# Patient Record
Sex: FEMALE | Race: WHITE | Employment: UNEMPLOYED | URBAN - METROPOLITAN AREA
[De-identification: names, ages, dates, MRNs, and addresses within clinical notes are randomized per-mention and may not be internally consistent; named-entity substitution may affect disease eponyms.]

---

## 2021-10-25 ENCOUNTER — HOSPITAL ENCOUNTER (EMERGENCY)
Age: 36
Discharge: HOME | End: 2021-10-25
Attending: STUDENT IN AN ORGANIZED HEALTH CARE EDUCATION/TRAINING PROGRAM
Payer: MEDICAID

## 2021-10-25 ENCOUNTER — APPOINTMENT (OUTPATIENT)
Dept: ULTRASOUND IMAGING | Age: 36
End: 2021-10-25
Attending: STUDENT IN AN ORGANIZED HEALTH CARE EDUCATION/TRAINING PROGRAM
Payer: MEDICAID

## 2021-10-25 VITALS
RESPIRATION RATE: 16 BRPM | DIASTOLIC BLOOD PRESSURE: 86 MMHG | BODY MASS INDEX: 24.88 KG/M2 | SYSTOLIC BLOOD PRESSURE: 123 MMHG | WEIGHT: 168 LBS | HEIGHT: 69 IN | HEART RATE: 74 BPM | TEMPERATURE: 98.6 F | OXYGEN SATURATION: 100 %

## 2021-10-25 DIAGNOSIS — R10.2 PELVIC PAIN: Primary | ICD-10-CM

## 2021-10-25 LAB
APPEARANCE UR: CLEAR
BILIRUB UR QL: NEGATIVE
COLOR UR: YELLOW
GLUCOSE UR STRIP.AUTO-MCNC: NEGATIVE MG/DL
HCG UR QL: NEGATIVE
HGB UR QL STRIP: NEGATIVE
KETONES UR QL STRIP.AUTO: NEGATIVE MG/DL
LEUKOCYTE ESTERASE UR QL STRIP.AUTO: NEGATIVE
NITRITE UR QL STRIP.AUTO: NEGATIVE
PH UR STRIP: 6.5 [PH] (ref 5–8)
PROT UR STRIP-MCNC: NEGATIVE MG/DL
SERVICE CMNT-IMP: NORMAL
SP GR UR REFRACTOMETRY: 1.01 (ref 1–1.03)
UROBILINOGEN UR QL STRIP.AUTO: 0.2 EU/DL (ref 0.2–1)
WET PREP GENITAL: NORMAL

## 2021-10-25 PROCEDURE — 87210 SMEAR WET MOUNT SALINE/INK: CPT

## 2021-10-25 PROCEDURE — 81003 URINALYSIS AUTO W/O SCOPE: CPT

## 2021-10-25 PROCEDURE — 99283 EMERGENCY DEPT VISIT LOW MDM: CPT

## 2021-10-25 PROCEDURE — 81025 URINE PREGNANCY TEST: CPT

## 2021-10-25 PROCEDURE — 87491 CHLMYD TRACH DNA AMP PROBE: CPT

## 2021-10-25 PROCEDURE — 76830 TRANSVAGINAL US NON-OB: CPT

## 2021-10-25 RX ORDER — TERCONAZOLE 4 MG/G
1 CREAM VAGINAL
COMMUNITY
End: 2021-11-16

## 2021-10-25 NOTE — ED TRIAGE NOTES
Patient c/o pelvic pain, lower back pain, and urinary frequency x 2+ weeks. States intermittent dizziness and nausea since last night.

## 2021-10-25 NOTE — ED PROVIDER NOTES
Patient is a previously healthy 26-year-old female. Patient resents today with primary complaint of 4 to 5 days of worsening left lower quadrant pelvic pain that radiates to her back, patient recently underwent treatment for yeast infection at an outside facility and reports the itching and vaginal discharge have completely resolved no change in her abdominal/pelvic pain. Patient denies any associated fever/chills, current vaginal discharge, dysuria, other abdominal pain, diarrhea, or vaginal bleeding. Past Medical History:   Diagnosis Date    Yeast infection        History reviewed. No pertinent surgical history. History reviewed. No pertinent family history. Social History     Socioeconomic History    Marital status:      Spouse name: Not on file    Number of children: Not on file    Years of education: Not on file    Highest education level: Not on file   Occupational History    Not on file   Tobacco Use    Smoking status: Never Smoker    Smokeless tobacco: Never Used   Substance and Sexual Activity    Alcohol use: Yes    Drug use: Yes     Types: Marijuana    Sexual activity: Not on file   Other Topics Concern    Not on file   Social History Narrative    Not on file     Social Determinants of Health     Financial Resource Strain:     Difficulty of Paying Living Expenses:    Food Insecurity:     Worried About Running Out of Food in the Last Year:     [address] in the Last Year:    Transportation Needs:     Lack of Transportation (Medical):      Lack of Transportation (Non-Medical):    Physical Activity:     Days of Exercise per Week:     Minutes of Exercise per Session:    Stress:     Feeling of Stress :    Social Connections:     Frequency of Communication with Friends and Family:     Frequency of Social Gatherings with Friends and Family:     Attends Alevism Services:     Active Member of Clubs or Organizations:     Attends Club or Organization Meetings:     Marital Status:    Intimate Partner Violence:     Fear of Current or Ex-Partner:     Emotionally Abused:     Physically Abused:     Sexually Abused: ALLERGIES: Patient has no known allergies. Review of Systems   Constitutional: Negative for chills and fever. HENT: Negative for rhinorrhea and sore throat. Eyes: Negative for discharge and redness. Respiratory: Negative for cough and shortness of breath. Cardiovascular: Negative for chest pain and leg swelling. Gastrointestinal: Negative for abdominal pain, diarrhea, nausea and vomiting. Genitourinary: Positive for flank pain and pelvic pain. Negative for difficulty urinating, dysuria, urgency, vaginal bleeding, vaginal discharge and vaginal pain. Musculoskeletal: Negative for back pain and neck pain. Skin: Negative for rash and wound. Neurological: Negative for syncope, light-headedness and headaches. Vitals:    10/25/21 0946   BP: 123/86   Pulse: 74   Resp: 16   Temp: 98.6 °F (37 °C)   SpO2: 100%   Weight: 76.2 kg (168 lb)   Height: 5' 9\" (1.753 m)            Physical Exam  Constitutional:       General: She is not in acute distress. Appearance: She is not ill-appearing, toxic-appearing or diaphoretic. HENT:      Head: Normocephalic and atraumatic. Right Ear: External ear normal.      Left Ear: External ear normal.      Nose: No congestion or rhinorrhea. Mouth/Throat:      Mouth: Mucous membranes are moist.      Pharynx: No oropharyngeal exudate or posterior oropharyngeal erythema. Eyes:      General:         Right eye: No discharge. Left eye: No discharge. Pupils: Pupils are equal, round, and reactive to light. Neck:      Vascular: No carotid bruit. Cardiovascular:      Rate and Rhythm: Normal rate and regular rhythm. Heart sounds: No murmur heard. No friction rub. No gallop. Pulmonary:      Effort: Pulmonary effort is normal. No respiratory distress.       Breath sounds: No stridor. No wheezing, rhonchi or rales. Abdominal:      General: Abdomen is flat. There is no distension. Tenderness: There is no abdominal tenderness. There is no right CVA tenderness, left CVA tenderness, guarding or rebound. Comments: No appreciable CVA tenderness bilaterally   Musculoskeletal:         General: No swelling, tenderness, deformity or signs of injury. Cervical back: No rigidity or tenderness. Right lower leg: No edema. Left lower leg: No edema. Lymphadenopathy:      Cervical: No cervical adenopathy. Skin:     General: Skin is warm. Capillary Refill: Capillary refill takes less than 2 seconds. Coloration: Skin is not jaundiced or pale. Findings: No bruising, erythema, lesion or rash. Neurological:      General: No focal deficit present. Mental Status: She is alert and oriented to person, place, and time. Sensory: No sensory deficit. Motor: No weakness. Psychiatric:         Mood and Affect: Mood normal.          MDM  Number of Diagnoses or Management Options  Pelvic pain  Diagnosis management comments: Patient presents with primary complaint of pelvic pain, given patient's age no significant diagnosis to evaluate for would be ovarian torsion, work-up is ultimately reassuring with no evidence of urinary tract infection, pyelonephritis, ovarian torsion, or other significant ovarian pathology. Patient does report she is approximately 21 days out from her period so [facility], will recommend patient takes over-the-counter medications as needed for pain and follow-up with her OB/GYN for resolution of symptoms. Patient reports no urinary complaints and wet prep is similarly reassuring, awaiting results of chlamydia/Neisseria gonorrhea testing.        Amount and/or Complexity of Data Reviewed  Clinical lab tests: reviewed  Tests in the radiology section of CPT®: reviewed           Procedures

## 2021-10-25 NOTE — DISCHARGE INSTRUCTIONS
Please contact your primary care doctor soon as possible for follow-up appointment next 3 to 7 days, if you do not have a primary care doctor included information on local practices currently excepting patients. If you develop any sudden change or symptoms including sudden/severe pain, inability to tolerate any food/drink, vaginal discharge, pain with urination, any other sudden/severe change in your condition please return immediately to emergency department for further evaluation and treatment.

## 2021-10-26 LAB
C TRACH RRNA SPEC QL NAA+PROBE: NEGATIVE
N GONORRHOEA RRNA SPEC QL NAA+PROBE: NEGATIVE
SPECIMEN SOURCE: NORMAL

## 2021-11-16 ENCOUNTER — APPOINTMENT (OUTPATIENT)
Dept: GENERAL RADIOLOGY | Age: 36
End: 2021-11-16
Attending: STUDENT IN AN ORGANIZED HEALTH CARE EDUCATION/TRAINING PROGRAM
Payer: MEDICAID

## 2021-11-16 ENCOUNTER — HOSPITAL ENCOUNTER (EMERGENCY)
Age: 36
Discharge: HOME | End: 2021-11-16
Attending: STUDENT IN AN ORGANIZED HEALTH CARE EDUCATION/TRAINING PROGRAM
Payer: MEDICAID

## 2021-11-16 VITALS
OXYGEN SATURATION: 100 % | HEIGHT: 69 IN | DIASTOLIC BLOOD PRESSURE: 77 MMHG | RESPIRATION RATE: 19 BRPM | WEIGHT: 167 LBS | TEMPERATURE: 98.6 F | BODY MASS INDEX: 24.73 KG/M2 | HEART RATE: 73 BPM | SYSTOLIC BLOOD PRESSURE: 137 MMHG

## 2021-11-16 DIAGNOSIS — M25.462 EFFUSION OF LEFT KNEE: ICD-10-CM

## 2021-11-16 DIAGNOSIS — M25.562 ACUTE PAIN OF LEFT KNEE: Primary | ICD-10-CM
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
## 2021-11-16 PROCEDURE — 73564 X-RAY EXAM KNEE 4 OR MORE: CPT

## 2021-11-16 PROCEDURE — 99282 EMERGENCY DEPT VISIT SF MDM: CPT

## 2021-11-16 NOTE — ED PROVIDER NOTES
EMERGENCY DEPARTMENT HISTORY AND PHYSICAL EXAM      Date: 11/16/2021  Patient Name: Ekta Russo    History of Presenting Illness     Chief Complaint   Patient presents with    Knee Injury       History (Context): Ekta Russo is a 31 y.o. female with no significant past medical history comes into the ED today due to left knee pain. Patient states that approximately 1 week ago she had a ground-level fall. She states that the fall was mechanical in nature. She landed on her left knee. Since that time she has had worsening pain to the left knee. She denies taking any medication for treatment of her symptoms prior to arrival.  She states pain is worsened with flexion and extension of the knee as well as ambulation. She denies any head trauma or anticoagulation usage. PCP: None        Past History     Past Medical History:   Past Medical History:   Diagnosis Date    Yeast infection        Past Surgical History:  History reviewed. No pertinent surgical history. Family History:  History reviewed. No pertinent family history. Social History:   Social History     Tobacco Use    Smoking status: Never Smoker    Smokeless tobacco: Never Used   Substance Use Topics    Alcohol use: Yes    Drug use: Yes     Types: Marijuana       Allergies:  No Known Allergies    PMH, PSH, family history, social history, allergies reviewed with the patient with significant items noted above. Review of Systems   Review of Systems   Constitutional: Negative for chills and fever. HENT: Negative for sore throat. Eyes: Negative for visual disturbance. Respiratory: Negative for shortness of breath. Cardiovascular: Negative for chest pain. Gastrointestinal: Negative for abdominal pain, nausea and vomiting. Genitourinary: Negative for difficulty urinating. Musculoskeletal: Negative for myalgias. Left knee pain     Skin: Negative for rash. Neurological: Negative for headaches.        Physical Exam Vitals:    11/16/21 0803   BP: 137/77   Pulse: 73   Resp: 19   Temp: 98.6 °F (37 °C)   SpO2: 100%   Weight: 75.8 kg (167 lb)   Height: 5' 9\" (1.753 m)       Physical Exam  Vitals and nursing note reviewed. Constitutional:       General: She is not in acute distress. Appearance: Normal appearance. HENT:      Head: Normocephalic and atraumatic. Mouth/Throat:      Mouth: Mucous membranes are moist.   Eyes:      General: No scleral icterus. Conjunctiva/sclera: Conjunctivae normal.   Cardiovascular:      Rate and Rhythm: Normal rate and regular rhythm. Pulses: Normal pulses. Comments: Normal peripheral perfusion  Pulmonary:      Effort: Pulmonary effort is normal. No respiratory distress. Abdominal:      General: There is no distension. Palpations: Abdomen is soft. Tenderness: There is no abdominal tenderness. Musculoskeletal:         General: Tenderness (Mild ttp to the left patella) present. No swelling, deformity or signs of injury. Normal range of motion. Cervical back: Normal range of motion and neck supple. Comments: lachmans test negative   Skin:     General: Skin is warm and dry. Findings: No rash. Neurological:      General: No focal deficit present. Mental Status: She is alert and oriented to person, place, and time. Mental status is at baseline. Psychiatric:         Mood and Affect: Mood normal.         Thought Content: Thought content normal.         Diagnostic Study Results     Labs -   No results found for this or any previous visit (from the past 12 hour(s)). Labs Reviewed - No data to display    Radiologic Studies -   XR KNEE LT MIN 4 V    (Results Pending)     CT Results  (Last 48 hours)    None        CXR Results  (Last 48 hours)    None          The laboratory results, imaging results, and other diagnostic exams were reviewed in the EMR. Medical Decision Making   I am the first provider for this patient.     I reviewed the vital signs, available nursing notes, past medical history, past surgical history, family history and social history. Vital Signs-Reviewed the patient's vital signs. Records Reviewed: Personally, on initial evaluation    MDM:   Magalys Youssef presents with complaint of left knee pain  DDX includes but is not limited to: Closed fracture, bone contusion, knee strain    Patient overall well-appearing, no acute distress, vital signs grossly within normal limits. Patient with mild tenderness to palpation to the left anterior patella of the knee. Will obtain imaging for further evaluation of patients complaint. Will continue to monitor and evaluate patient while in the ED. Orders as below:  Orders Placed This Encounter    XR KNEE LT MIN 4 V        ED Course:   ED Course as of 11/16/21 0942   Tue Nov 16, 2021   0941 Patient's x-ray shows small joint effusion and anterior knee soft tissue swelling without acute osseous abnormality. Will discharge patient home with return precautions and follow-up recommendations. Patient verbalized understanding and is without any further questions. [DV]      ED Course User Index  [DV] Anisa Quinones DO           Procedures:  Procedures        Diagnosis and Disposition     CLINICAL IMPRESSION:  No diagnosis found. There are no discharge medications for this patient. Disposition: Home    Patient condition at time of disposition: Stable    DISCHARGE NOTE:   Pt has been reexamined. Patient has no new complaints, changes, or physical findings. Care plan outlined and precautions discussed. Results were reviewed with the patient. All medications were reviewed with the patient. All of pt's questions and concerns were addressed. Alarm symptoms and return precautions associated with chief complaint and evaluation were reviewed with the patient in detail. The patient demonstrated adequate understanding.   Patient was instructed to follow up with PCP, Ortho, as well as strict return precautions to the ED upon further deterioration. Patient is ready to go home. The patient is happy with this plan      Dragon Disclaimer     Please note that this dictation was completed with [employer], the computer voice recognition software. Quite often unanticipated grammatical, syntax, homophones, and other interpretive errors are inadvertently transcribed by the computer software. Please disregard these errors. Please excuse any errors that have escaped final proofreading. Nichole OLSON.

## 2021-11-16 NOTE — Clinical Note
[address] [facility] EMERGENCY DEPT  [address] [id number] [phone]    Work/School Note    Date: 11/16/2021    To Whom It May concern:      Kaitlyn Campos was seen and treated today in the emergency room by the following provider(s):  Attending Provider: Negrito Manjarrez DO. Kaitlyn Campos is excused from work/school on 11/16/21. She is clear to return to work/school on 11/17/21.         Sincerely,          Caye Dandy, DO
[address] [facility] EMERGENCY DEPT  [address] [address] [phone]    Work/School Note    Date: 11/16/2021    To Whom It May concern:      Kaitlyn Jo was seen and treated today in the emergency room by the following provider(s):  Attending Provider: Tere Corona DO. Kaitlyn Jo is excused from work/school on 11/16/21. She is clear to return to work/school on 11/17/21.         Sincerely,          Heather Gomez DO
no fever and no chills.